# Patient Record
Sex: MALE | URBAN - METROPOLITAN AREA
[De-identification: names, ages, dates, MRNs, and addresses within clinical notes are randomized per-mention and may not be internally consistent; named-entity substitution may affect disease eponyms.]

---

## 2019-11-27 ENCOUNTER — OFFICE VISIT (OUTPATIENT)
Dept: URBAN - METROPOLITAN AREA CLINIC 76 | Facility: CLINIC | Age: 64
End: 2019-11-27
Payer: COMMERCIAL

## 2019-11-27 DIAGNOSIS — H52.4 PRESBYOPIA: ICD-10-CM

## 2019-11-27 DIAGNOSIS — H35.033 HYPERTENSIVE RETINOPATHY, BILATERAL: ICD-10-CM

## 2019-11-27 PROCEDURE — 92014 COMPRE OPH EXAM EST PT 1/>: CPT | Performed by: OPTOMETRIST

## 2019-11-27 ASSESSMENT — KERATOMETRY
OS: 40.38
OD: 40.75

## 2019-11-27 ASSESSMENT — INTRAOCULAR PRESSURE
OS: 16
OD: 15

## 2019-11-27 ASSESSMENT — VISUAL ACUITY
OD: 20/20
OS: 20/20

## 2019-11-27 NOTE — IMPRESSION/PLAN
Impression: Presbyopia: H52.4. Plan: Pt prefers OTCR only, working well for him. No gls rx given today.

## 2019-11-27 NOTE — IMPRESSION/PLAN
Impression: Hypertensive retinopathy, bilateral: H35.033. mild. OU. Plan: Discussed condition and risks of elevated blood pressure. Pt states blood pressure is borderline. Not on HBP meds.

## 2019-11-27 NOTE — IMPRESSION/PLAN
Impression: Type 2 diabetes mellitus without complications: Y68.5. OU. Plan: DM: No diabetic retinopathy. Discussed ocular and systemic benefits of blood sugar control. Patient was instructed to monitor vision for changes and to call if noted.

## 2021-01-25 ENCOUNTER — OFFICE VISIT (OUTPATIENT)
Dept: URBAN - METROPOLITAN AREA CLINIC 76 | Facility: CLINIC | Age: 66
End: 2021-01-25
Payer: COMMERCIAL

## 2021-01-25 DIAGNOSIS — H43.813 VITREOUS DEGENERATION, BILATERAL: ICD-10-CM

## 2021-01-25 DIAGNOSIS — E11.9 TYPE 2 DIABETES MELLITUS W/O COMPLICATION: ICD-10-CM

## 2021-01-25 DIAGNOSIS — H25.13 AGE-RELATED NUCLEAR CATARACT, BILATERAL: ICD-10-CM

## 2021-01-25 PROCEDURE — 92014 COMPRE OPH EXAM EST PT 1/>: CPT | Performed by: OPTOMETRIST

## 2021-01-25 ASSESSMENT — INTRAOCULAR PRESSURE
OD: 15
OS: 16

## 2021-01-25 ASSESSMENT — KERATOMETRY
OD: 41.00
OS: 40.75

## 2021-01-25 ASSESSMENT — VISUAL ACUITY
OS: 20/20
OD: 20/20

## 2021-01-25 NOTE — IMPRESSION/PLAN
Impression: Type 2 diabetes mellitus w/o complication: J34.9. Bilateral. Plan: Discussed diagnosis in detail with patient. No treatment is required at this time. Emphasized blood sugar control. Call if [address] worsens. Will continue to observe condition and or symptoms. Recommend yearly exams. Letter sent to PCP.

## 2022-08-25 ENCOUNTER — OFFICE VISIT (OUTPATIENT)
Dept: URBAN - METROPOLITAN AREA CLINIC 76 | Facility: CLINIC | Age: 67
End: 2022-08-25
Payer: MEDICARE

## 2022-08-25 DIAGNOSIS — E11.9 TYPE 2 DIABETES MELLITUS W/O COMPLICATION: Primary | ICD-10-CM

## 2022-08-25 DIAGNOSIS — Z79.84 LONG TERM (CURRENT) USE OF ORAL HYPOGLYCEMIC DRUGS: ICD-10-CM

## 2022-08-25 DIAGNOSIS — H25.13 AGE-RELATED NUCLEAR CATARACT, BILATERAL: ICD-10-CM

## 2022-08-25 DIAGNOSIS — H43.813 VITREOUS DEGENERATION, BILATERAL: ICD-10-CM

## 2022-08-25 PROCEDURE — 92012 INTRM OPH EXAM EST PATIENT: CPT | Performed by: OPTOMETRIST

## 2022-08-25 ASSESSMENT — KERATOMETRY
OS: 40.63
OD: 40.63

## 2022-08-25 ASSESSMENT — INTRAOCULAR PRESSURE
OD: 13
OS: 13

## 2022-08-25 NOTE — IMPRESSION/PLAN
Impression: Type 2 diabetes mellitus w/o complication: I50.6. Bilateral. Plan: Discussed diagnosis in detail with patient. No treatment is required at this time. Emphasized blood sugar control. Call if [address] worsens. Will continue to observe condition and or symptoms. Recommend yearly exams. Letter sent to PCP.

## 2022-08-25 NOTE — IMPRESSION/PLAN
Impression: Long term (current) use of oral hypoglycemic drugs: Z79.84. Bilateral. Plan: Observe.  See plan #1

## 2022-09-22 ENCOUNTER — OFFICE VISIT (OUTPATIENT)
Dept: URBAN - METROPOLITAN AREA CLINIC 76 | Facility: CLINIC | Age: 67
End: 2022-09-22

## 2022-09-22 DIAGNOSIS — H52.4 PRESBYOPIA: Primary | ICD-10-CM

## 2022-09-22 ASSESSMENT — KERATOMETRY
OS: 40.63
OD: 40.75

## 2022-09-22 ASSESSMENT — VISUAL ACUITY
OS: 20/20
OD: 20/20

## 2024-06-14 ENCOUNTER — OFFICE VISIT (OUTPATIENT)
Dept: URBAN - METROPOLITAN AREA CLINIC 76 | Facility: CLINIC | Age: 69
End: 2024-06-14
Payer: COMMERCIAL

## 2024-06-14 DIAGNOSIS — E11.9 TYPE 2 DIABETES MELLITUS W/O COMPLICATION: Primary | ICD-10-CM

## 2024-06-14 DIAGNOSIS — H25.13 AGE-RELATED NUCLEAR CATARACT, BILATERAL: ICD-10-CM

## 2024-06-14 DIAGNOSIS — H52.4 PRESBYOPIA: ICD-10-CM

## 2024-06-14 DIAGNOSIS — H35.30 UNSPECIFIED MACULAR DEGENERATION: ICD-10-CM

## 2024-06-14 DIAGNOSIS — H10.45 OTHER CHRONIC ALLERGIC CONJUNCTIVITIS: ICD-10-CM

## 2024-06-14 DIAGNOSIS — Z79.84 LONG TERM (CURRENT) USE OF ORAL HYPOGLYCEMIC DRUGS: ICD-10-CM

## 2024-06-14 PROCEDURE — 99214 OFFICE O/P EST MOD 30 MIN: CPT | Performed by: OPTOMETRIST

## 2024-06-14 ASSESSMENT — KERATOMETRY
OS: 40.50
OD: 40.88

## 2024-06-14 ASSESSMENT — INTRAOCULAR PRESSURE
OD: 10
OS: 11